# Patient Record
Sex: MALE | Race: OTHER | HISPANIC OR LATINO | Employment: OTHER | URBAN - METROPOLITAN AREA
[De-identification: names, ages, dates, MRNs, and addresses within clinical notes are randomized per-mention and may not be internally consistent; named-entity substitution may affect disease eponyms.]

---

## 2020-01-07 ENCOUNTER — HOSPITAL ENCOUNTER (EMERGENCY)
Facility: HOSPITAL | Age: 20
Discharge: HOME | End: 2020-01-07

## 2020-01-07 VITALS
WEIGHT: 290 LBS | OXYGEN SATURATION: 97 % | TEMPERATURE: 99 F | DIASTOLIC BLOOD PRESSURE: 87 MMHG | SYSTOLIC BLOOD PRESSURE: 145 MMHG | HEART RATE: 93 BPM | RESPIRATION RATE: 18 BRPM | BODY MASS INDEX: 46.61 KG/M2 | HEIGHT: 66 IN

## 2020-01-07 DIAGNOSIS — J40 BRONCHITIS: Primary | ICD-10-CM

## 2020-01-07 DIAGNOSIS — R05.9 COUGH: ICD-10-CM

## 2020-01-07 LAB
DEPRECATED S PYO AG THROAT QL EIA: NEGATIVE
INFLUENZA A, MOLECULAR: NEGATIVE
INFLUENZA B, MOLECULAR: NEGATIVE
SPECIMEN SOURCE: NORMAL

## 2020-01-07 PROCEDURE — 87880 STREP A ASSAY W/OPTIC: CPT

## 2020-01-07 PROCEDURE — 87502 INFLUENZA DNA AMP PROBE: CPT

## 2020-01-07 PROCEDURE — 99284 EMERGENCY DEPT VISIT MOD MDM: CPT | Mod: 25

## 2020-01-07 PROCEDURE — 71046 X-RAY EXAM CHEST 2 VIEWS: CPT | Mod: TC,FY

## 2020-01-07 PROCEDURE — 71046 X-RAY EXAM CHEST 2 VIEWS: CPT | Mod: 26,,, | Performed by: RADIOLOGY

## 2020-01-07 PROCEDURE — 87081 CULTURE SCREEN ONLY: CPT

## 2020-01-07 PROCEDURE — 71046 XR CHEST PA AND LATERAL: ICD-10-PCS | Mod: 26,,, | Performed by: RADIOLOGY

## 2020-01-07 RX ORDER — BENZONATATE 100 MG/1
100 CAPSULE ORAL 3 TIMES DAILY PRN
Qty: 30 CAPSULE | Refills: 0 | Status: SHIPPED | OUTPATIENT
Start: 2020-01-07 | End: 2020-01-17

## 2020-01-07 RX ORDER — PREDNISONE 20 MG/1
40 TABLET ORAL DAILY
Qty: 10 TABLET | Refills: 0 | Status: SHIPPED | OUTPATIENT
Start: 2020-01-07 | End: 2020-01-12

## 2020-01-08 NOTE — ED PROVIDER NOTES
Encounter Date: 1/7/2020       History     Chief Complaint   Patient presents with    Headache    Cough    Sore Throat     Giacomo Daley is a 20 y.o male with no sign PMHx. He presents to ED with cough, mild sore throat and congestion for 3 weeks    No wheezing or respiratory distress    No abdominal pain. No N/V/D. He is tolerating fluids     He reports occasional chills    He reports expectorating occasional bleed-tinged sputum    No fever, chest pain or dyspnea    He has taken a few doses of over the counter cough mediation with no relief in symptoms    No close sick contacts at home          Review of patient's allergies indicates:  No Known Allergies  History reviewed. No pertinent past medical history.  No past surgical history on file.  History reviewed. No pertinent family history.  Social History     Tobacco Use    Smoking status: Not on file   Substance Use Topics    Alcohol use: Not on file    Drug use: Not on file     Review of Systems   Constitutional: Positive for chills. Negative for activity change, appetite change, diaphoresis, fatigue, fever and unexpected weight change.   HENT: Positive for congestion, rhinorrhea (clear), sneezing and sore throat. Negative for dental problem, drooling, ear discharge, ear pain, facial swelling, hearing loss, mouth sores, nosebleeds, postnasal drip, sinus pressure, sinus pain, tinnitus and trouble swallowing.    Eyes: Negative.    Respiratory: Positive for cough. Negative for chest tightness, shortness of breath, wheezing and stridor.    Cardiovascular: Negative.    Gastrointestinal: Negative.    Endocrine: Negative.    Genitourinary: Negative.    Musculoskeletal: Negative.    Allergic/Immunologic: Negative.    Neurological: Negative.    Hematological: Negative.    Psychiatric/Behavioral: Negative.        Physical Exam     Initial Vitals [01/07/20 1921]   BP Pulse Resp Temp SpO2   (!) 145/87 93 18 98.7 °F (37.1 °C) 97 %      MAP       --         Physical  Exam    Nursing note and vitals reviewed.  Constitutional: Vital signs are normal. He appears well-developed and well-nourished.   HENT:   Head: Normocephalic.   Right Ear: Hearing, tympanic membrane, external ear and ear canal normal.   Left Ear: Hearing, tympanic membrane, external ear and ear canal normal.   Nose: Nose normal. Right sinus exhibits no maxillary sinus tenderness and no frontal sinus tenderness. Left sinus exhibits no maxillary sinus tenderness and no frontal sinus tenderness.   Mouth/Throat: Uvula is midline, oropharynx is clear and moist and mucous membranes are normal.   Eyes: Conjunctivae are normal.   Neck: Normal range of motion. Neck supple.   Cardiovascular: Normal rate.   Pulmonary/Chest: Breath sounds normal.   Musculoskeletal: Normal range of motion.   Neurological: He is alert and oriented to person, place, and time. GCS score is 15. GCS eye subscore is 4. GCS verbal subscore is 5. GCS motor subscore is 6.   Skin: Skin is warm. Capillary refill takes less than 2 seconds.   Psychiatric: He has a normal mood and affect. His behavior is normal. Judgment and thought content normal.         ED Course   Procedures  Labs Reviewed   THROAT SCREEN, RAPID   INFLUENZA A & B BY MOLECULAR          Imaging Results    None          Medical Decision Making:   Initial Assessment:   Patient with cough, mild sore throat and congestion for 3 weeks    No wheezing or respiratory distress    No abdominal pain. No N/V/D. He is tolerating fluids     He reports occasional chills    He reports expectorating occasional bleed-tinged sputum    No fever, chest pain or dyspnea    He has taken a few doses of over the counter cough mediation with no relief in symptoms    No close sick contacts at home      Differential Diagnosis:   URI, influenza, strep, bronchitis, pneumonia, sinusitis   ED Management:  Strep and influenza negative    CXR normal per Dr. Coello    Discussed physical exam findings with patient  No  acute emergent medical condition identified at this time to warrant further testing/diagnostics  At this time, I believe the patient is clinically stable for discharge.   Patient to follow up with PCP in 1-2 days.  The patient acknowledges that close follow up with a MD is required after all ER visits  Pt given instructions; take all medications prescribed in the ER as directed.   Patient agrees to comply with all instruction and direction given in the ER  Pt agrees to return to ER if any symptoms reoccur                            ED Course as of Jan 07 2209 Tue Jan 07, 2020 2208 Report/care to Dr. Coello    [JL]      ED Course User Index  [JL] Catherine Purvis NP                Clinical Impression:       ICD-10-CM ICD-9-CM   1. Bronchitis J40 490   2. Cough R05 786.2                             Catherine Purvis NP  01/07/20 0402

## 2020-01-08 NOTE — ED NOTES
Pt mother in room with pt, asks when provider will be in to evaluate pt, states has only seen nurse thus far, will notify NP of pt & pt mother's concerns.

## 2020-01-10 LAB — BACTERIA THROAT CULT: NORMAL
